# Patient Record
Sex: MALE | Race: BLACK OR AFRICAN AMERICAN | NOT HISPANIC OR LATINO | URBAN - METROPOLITAN AREA
[De-identification: names, ages, dates, MRNs, and addresses within clinical notes are randomized per-mention and may not be internally consistent; named-entity substitution may affect disease eponyms.]

---

## 2022-07-12 ENCOUNTER — EMERGENCY (EMERGENCY)
Facility: HOSPITAL | Age: 52
LOS: 1 days | Discharge: HOME | End: 2022-07-12
Attending: EMERGENCY MEDICINE | Admitting: EMERGENCY MEDICINE

## 2022-07-12 VITALS
DIASTOLIC BLOOD PRESSURE: 65 MMHG | OXYGEN SATURATION: 94 % | HEART RATE: 103 BPM | HEIGHT: 73 IN | RESPIRATION RATE: 18 BRPM | SYSTOLIC BLOOD PRESSURE: 113 MMHG | TEMPERATURE: 98 F

## 2022-07-12 PROCEDURE — 73630 X-RAY EXAM OF FOOT: CPT | Mod: 26,50

## 2022-07-12 PROCEDURE — 99284 EMERGENCY DEPT VISIT MOD MDM: CPT | Mod: 25

## 2022-07-12 RX ORDER — IBUPROFEN 200 MG
600 TABLET ORAL ONCE
Refills: 0 | Status: COMPLETED | OUTPATIENT
Start: 2022-07-12 | End: 2022-07-12

## 2022-07-12 RX ADMIN — Medication 600 MILLIGRAM(S): at 15:32

## 2022-07-12 NOTE — ED ADULT NURSE NOTE - CAS ELECT INFOMATION PROVIDED
Dc instructions provided. Pt refused to put on CAm boot despite assistantce being offered by RN and PCT.Pt yelling. Escorted out by security./DC instructions

## 2022-07-12 NOTE — ED PROVIDER NOTE - MUSCULOSKELETAL, MLM
+right foot diffusely tender. +scaling and crusting to bilateral feet appears to be chronic. +bilateral pedal edema, with no erythema
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
## 2022-07-12 NOTE — ED PROVIDER NOTE - PATIENT PORTAL LINK FT
You can access the FollowMyHealth Patient Portal offered by [facility] by registering at the following website: [email or web address]. By joining [facility]’s FollowMyHealth portal, you will also be able to view your health information using other applications (apps) compatible with our system.
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
## 2022-07-12 NOTE — ED ADULT NURSE NOTE - OBJECTIVE STATEMENT
Pt presents to ED complaining of R foot pain. pt arrives to ED with ace wrap in place. Pt requesting food.Ambualting without difficulty.
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
## 2022-07-14 DIAGNOSIS — S92.311A DISPLACED FRACTURE OF FIRST METATARSAL BONE, RIGHT FOOT, INITIAL ENCOUNTER FOR CLOSED FRACTURE: ICD-10-CM

## 2022-07-14 DIAGNOSIS — M79.671 PAIN IN RIGHT FOOT: ICD-10-CM
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
## 2023-10-23 ENCOUNTER — EMERGENCY (EMERGENCY)
Facility: HOSPITAL | Age: 53
LOS: 1 days | Discharge: HOME | End: 2023-10-23
Attending: EMERGENCY MEDICINE | Admitting: EMERGENCY MEDICINE
Payer: MEDICARE

## 2023-10-23 VITALS
HEART RATE: 87 BPM | DIASTOLIC BLOOD PRESSURE: 76 MMHG | OXYGEN SATURATION: 98 % | SYSTOLIC BLOOD PRESSURE: 136 MMHG | RESPIRATION RATE: 18 BRPM | TEMPERATURE: 98 F

## 2023-10-23 DIAGNOSIS — M79.671 PAIN IN RIGHT FOOT: ICD-10-CM

## 2023-10-23 DIAGNOSIS — L20.9 ATOPIC DERMATITIS, UNSPECIFIED: ICD-10-CM

## 2023-10-23 DIAGNOSIS — Z59.00 HOMELESSNESS UNSPECIFIED: ICD-10-CM

## 2023-10-23 DIAGNOSIS — M79.672 PAIN IN LEFT FOOT: ICD-10-CM

## 2023-10-23 DIAGNOSIS — M79.89 OTHER SPECIFIED SOFT TISSUE DISORDERS: ICD-10-CM

## 2023-10-23 DIAGNOSIS — F17.200 NICOTINE DEPENDENCE, UNSPECIFIED, UNCOMPLICATED: ICD-10-CM

## 2023-10-23 DIAGNOSIS — J45.909 UNSPECIFIED ASTHMA, UNCOMPLICATED: ICD-10-CM

## 2023-10-23 PROBLEM — Z78.9 OTHER SPECIFIED HEALTH STATUS: Chronic | Status: ACTIVE | Noted: 2022-07-12

## 2023-10-23 PROCEDURE — 99283 EMERGENCY DEPT VISIT LOW MDM: CPT

## 2023-10-23 RX ORDER — BACITRACIN ZINC 500 UNIT/G
1 OINTMENT IN PACKET (EA) TOPICAL ONCE
Refills: 0 | Status: COMPLETED | OUTPATIENT
Start: 2023-10-23 | End: 2023-10-23

## 2023-10-23 RX ORDER — IBUPROFEN 200 MG
800 TABLET ORAL ONCE
Refills: 0 | Status: COMPLETED | OUTPATIENT
Start: 2023-10-23 | End: 2023-10-23

## 2023-10-23 RX ADMIN — Medication 1 APPLICATION(S): at 06:57

## 2023-10-23 RX ADMIN — Medication 800 MILLIGRAM(S): at 06:57

## 2023-10-23 SDOH — ECONOMIC STABILITY - HOUSING INSECURITY: HOMELESSNESS UNSPECIFIED: Z59.00

## 2023-10-23 NOTE — ED PROVIDER NOTE - PROGRESS NOTE DETAILS
patient refused foot cleaning. Given new socks and rx for topical steroids. Conservative management discussed with the patient in detail.  Close PMD follow up encouraged.  Strict ED return instructions discussed in detail and patient given the opportunity to ask any questions about their discharge diagnosis and instructions

## 2023-10-23 NOTE — ED PROVIDER NOTE - OBJECTIVE STATEMENT
53M history of asthma, atopic dermatitis  presents with concern of pain to both feet and worsening atopic dermatitis. Patient is homeless currently and has poor footwear and has been on his feet more recently. Now states that they are swollen and painful throughout, moderate severity, and usually relieved with ibuprofen but has not had access to any. Also endorses worsening of his eczema to the lower arms and lower legs. Denies fevers/chills, nausea/vomiting, chest pain, shortness of breath, abdominal pain.

## 2023-10-23 NOTE — ED PROVIDER NOTE - PATIENT PORTAL LINK FT
You can access the FollowMyHealth Patient Portal offered by [facility] by registering at the following website: [email or web address]. By joining [facility]’s FollowMyHealth portal, you will also be able to view your health information using other applications (apps) compatible with our system.

## 2023-10-23 NOTE — ED PROVIDER NOTE - CLINICAL SUMMARY MEDICAL DECISION MAKING FREE TEXT BOX
53M history of asthma, atopic dermatitis  presents with concern of pain to both feet and worsening atopic dermatitis. Patient is homeless currently and has poor footwear and has been on his feet more recently. Now states that they are swollen and painful throughout, moderate severity, and usually relieved with ibuprofen but has not had access to any. Also endorses worsening of his eczema to the lower arms and lower legs. Denies fevers/chills, nausea/vomiting, chest pain, shortness of breath, abdominal pain.    PE: Mildly unkempt, no acute distress.  Nonlabored respirations.  Alert and oriented x3.  Eczematous skin changes to the bilateral lower arms and bilateral lower legs.  Feet with sloughing/blistering over the dorsum without signs of secondary infection.    MDM: Patient presents for evaluation of foot swelling/pain and atopic dermatitis.  Patient's social circumstances and lack of access to proper footwear have made his bilateral foot pain worse.  Concomitantly, patient has not been able to use topical steroid for his eczema so this has flared up as well.  On exam there are no signs of secondary skin infections to either of these complaints.  Patient simply asking for bacitracin and ibuprofen as he is on his way back to [place].

## 2023-10-23 NOTE — ED PROVIDER NOTE - NSFOLLOWUPINSTRUCTIONS_ED_ALL_ED_FT
Please read all handouts provided to you from the emergency department. Seek immediate medical attention for any new/worsening signs or symptoms.  Take any prescribed medications as directed. Please follow up with  your primary physician in the next 3-5 days.    Eczema    Eczema, also called atopic dermatitis, is a skin disorder that causes inflammation of the skin. It causes a red rash and dry, scaly skin. The skin becomes very itchy. Eczema is generally worse during the cooler winter months and often improves with the warmth of summer. Eczema usually starts showing signs in infancy. Some children outgrow eczema, but it may last through adulthood.     CAUSES  The exact cause of eczema is not known, but it appears to run in families. People with eczema often have a family history of eczema, allergies, asthma, or hay fever. Eczema is not contagious.    Flare-ups of the condition may be caused by:     Contact with something you are sensitive or allergic to.    Stress.     SIGNS AND SYMPTOMS  Dry, scaly skin.    Red, itchy rash.    Itchiness. This may occur before the skin rash and may be very intense.      DIAGNOSIS  The diagnosis of eczema is usually made based on symptoms and medical history.    TREATMENT  Eczema cannot be cured, but symptoms usually can be controlled with treatment and other strategies. A treatment plan might include:    Controlling the itching and scratching.    Use over-the-counter antihistamines as directed for itching. This is especially useful at night when the itching tends to be worse.    Use over-the-counter steroid creams as directed for itching.    Avoid scratching. Scratching makes the rash and itching worse. It may also result in a skin infection (impetigo) due to a break in the skin caused by scratching.    Keeping the skin well moisturized with creams every day. This will seal in moisture and help prevent dryness. Lotions that contain alcohol and water should be avoided because they can dry the skin.    Limiting exposure to things that you are sensitive or allergic to (allergens).    Recognizing situations that cause stress.    Developing a plan to manage stress.      HOME CARE INSTRUCTIONS  Only take over-the-counter or prescription medicines as directed by your health care provider.    Do not use anything on the skin without checking with your health care provider.    Keep baths or showers short (5 minutes) in warm (not hot) water. Use mild cleansers for bathing. These should be unscented. You may add nonperfumed bath oil to the bath water. It is best to avoid soap and bubble bath.    Immediately after a bath or shower, when the skin is still damp, apply a moisturizing ointment to the entire body. This ointment should be a petroleum ointment. This will seal in moisture and help prevent dryness. The thicker the ointment, the better. These should be unscented.    Keep fingernails cut short. Children with eczema may need to wear soft gloves or mittens at night after applying an ointment.    Dress in clothes made of cotton or cotton blends. Dress lightly, because heat increases itching.    A child with eczema should stay away from anyone with fever blisters or cold sores. The virus that causes fever blisters (herpes simplex) can cause a serious skin infection in children with eczema.     SEEK MEDICAL CARE IF:  Your itching interferes with sleep.    Your rash gets worse or is not better within 1 week after starting treatment.    You see pus or soft yellow scabs in the rash area.    You have a fever.    You have a rash flare-up after contact with someone who has fever blisters.      ADDITIONAL NOTES AND INSTRUCTIONS    Please follow up with your Primary MD in 24-48 hr.  Seek immediate medical care for any new/worsening signs or symptoms.

## 2023-10-23 NOTE — ED ADULT NURSE NOTE - NSFALLUNIVINTERV_ED_ALL_ED
Bed/Stretcher in lowest position, wheels locked, appropriate side rails in place/Call bell, personal items and telephone in reach/Instruct patient to call for assistance before getting out of bed/chair/stretcher/Non-slip footwear applied when patient is off stretcher/[place] to call system/Physically safe environment - no spills, clutter or unnecessary equipment/Purposeful proactive rounding/Room/bathroom lighting operational, light cord in reach

## 2023-10-23 NOTE — ED PROVIDER NOTE - PHYSICAL EXAMINATION
PE: Mildly unkempt, no acute distress.  Nonlabored respirations.  Alert and oriented x3.  Eczematous skin changes to the bilateral lower arms and bilateral lower legs.  Feet with sloughing/blistering over the dorsum without signs of secondary infection.
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
